# Patient Record
(demographics unavailable — no encounter records)

---

## 2024-12-26 NOTE — ASSESSMENT
[FreeTextEntry1] : A/P: 35 year old male here for his initial medical weight loss appointment. PMHx is significant for class 3 obesity, prediabetes, schizoaffective disorder, medication induced weight gain, depression, disturbed sleep patterns/insomnia, food insecurity, hx of alcohol misuse, FLOWER and tardive dyskinesia.  -Will work together with an interdisciplinary team to help achieve their weight loss goals. Will order a set of labs to better understand her metabolic state. Pending review, will have discussion about possible AOMs. For management of medication induced weight gain and prediabetes, will try metformin  mg. Discussed possible side effects including gastrointestinal upset, especially diarrhea. Will start with one tab with breakfast for one week. Increase to one tab with breakfast and one tab with dinner the second week. Rx sent today.  In future, will try to get approval for a GLP-1  Will connect with RD to help optimize nutrition and to increase protein as tolerated. Reduce his juice consumption Recommended that he repeat his sleep study Schizoaffective disorder and depression - under the care of a mental health team. Continue haldol and abilify. Made aware of weight promoting potential of these meds -Continue to increase his physical activity as tolerated. Add weights  F/U with RD first available F/U with MD in ~6-8 weeks  Patient was seen by Dr. Buck on 9/19/24. Time spent with patient was greater than 65 minutes, including chart review, time spent with patient, and charting.

## 2024-12-26 NOTE — HISTORY OF PRESENT ILLNESS
[de-identified] : Referred by: Insurance   HPI: 35 year old male here for his initial medical weight loss appointment. PMHx is significant for class 3 obesity, prediabetes, schizoaffective disorder, medication induced weight gain, depression, disturbed sleep patterns/insomnia, food insecurity, hx of alcohol misuse, FLOWER and tardive dyskinesia.    Weight History: Highest Weight: 310 lbs (2022) Lowest Weight: 210 lbs (10 yrs ago) Current Weight: 270 lbs/45.63 kg/m2   Previous Weight Loss Efforts:  Has struggled with his weight for years. In 2022, his weight was 310 lbs and was able to lose down to 270 lbs.  Has lost weight in recent months (~30 lbs) due to being homeless.   Just established a stable place to live ~1.5 months ago.  Was homeless from December 2023 until Aug.   Has previously tried Ozempic. Was on it briefly and lost 5 lbs.  Was also tried on metformin - tolerated well. Uncertain why it was discontinued.  Reports that he keeps losing and gaining ~10 lbs. 260 +/- 10 lbs  Has been on many medications for management of his schizoaffective disorder contributing to weight gain. Medications include: abilify and haldol.   Previous use of AOMs: Ozempic as above in the HPI Medications that may have contributed to weight gain: As above in the HPI   Pertinent Labs: none at this time   Review of Systems: denies numbness and tingling Reflux improved since weight loss +HAs Denies knee and back pain central adiposity  Eating behaviors: Craves sweets (doughnuts and caramel apples_ Loves cheese Has been working to decrease his intake of fried food Struggles with night eating +Bored, emotional and stress eating Has decreased his intake of soda   24 Hr Recall: B: Frosted Flakes cereal  Sn: Chips (made in the air fryer) L (12-1pm): Rice and chorizo D: Rice and beef vs chicken vs pork +/- tortillas (brother cooks this meal) Sn: Chips vs pickles Margarita: rare coffee ([employer]), drinks lots of sugary juices, drinks powdered drinks   Movement: Walking Goes to the gym for 45-60 min Does the treadmill for 45-60  min 5-7 days/week No weights  SocHx: Now lives in permanent housing - was recently homeless Currently looking for work  Former smoker - quit 10 yrs ago - smoked 1ppd x 10 yrs Drinks alcohol - has a history of alcohol use disorder - now decreased   Sleep: Goes to bed at 4 AM Sleeps throughout the day Wakes up at 8 AM Back to sleep at 12 pm - wakes up at 6 pm Hx of FLOWER - had a CPAP   Mental Health: Going through a divorce Goes to therapy 1x/week Getting a new psychiatrist on Tuesday Had food stamps. Was cut off when he was homeless. Is trying to re-establish them - has a

## 2025-02-13 NOTE — HISTORY OF PRESENT ILLNESS
[de-identified] : Referred by: Insurance   HPI: 35 year old male here for his f/u medical weight loss appointment. PMHx is significant for class 3 obesity, prediabetes, schizoaffective disorder, medication induced weight gain, dyslipidemia, metabolic syndrome, vitamin D deficiency, depression, disturbed sleep patterns/insomnia, fatty liver, food insecurity, hx of alcohol misuse, FLOWER and tardive dyskinesia.    Weight History: Highest Weight: 310 lbs (2022) Lowest Weight: 210 lbs (10 yrs ago) Weight at [facility]: 270 lbs/45.63 kg/m2 (Sept 2024) Current weight: 283 lbs  Since we last met, he reports that it took more than one month to get the metformin. Reports that his cravings continue to be greatest at night - after his evening meds. Since starting metformin, reports that his appetite has decreased during the day. Was one month off of Haldol. He started to hear voices again - is now back on it and is no longer hearing voices. Does note that the medications make him hungry.  Has started to go to the gym 3x/week for 45 min. Also has a treadmill in his home and has started to do weights for 25 min.   His brother cooks for him at night.   Reports that he has noted that he is more depressed and feels more social isolation --> sleeping more.  Reports that the Ingrezza makes him feel more sleepy.   Review of Systems is also notable for: denies numbness and tingling Reflux improved since weight loss +HAs Denies knee and back pain central adiposity  Labs Oct 2024: AST 42 H ALT 83 H hemoglobin A1c 5.9 H TChol 208H TGs 153 H HDL 39 L  H  H low vitamin D levels   Previous Weight Loss Efforts:  Has struggled with his weight for years. In 2022, his weight was 310 lbs and was able to lose down to 270 lbs.  Has lost weight in recent months (~30 lbs) due to being homeless.   Just established a stable place to live ~1.5 months ago.  Was homeless from December 2023 until Aug.   Has previously tried Ozempic. Was on it briefly and lost 5 lbs.  Was also tried on metformin - tolerated well. Uncertain why it was discontinued.  Reports that he keeps losing and gaining ~10 lbs. 260 +/- 10 lbs  Has been on many medications for management of his schizoaffective disorder contributing to weight gain. Medications include: abilify and haldol.   Previous use of AOMs: Ozempic as above in the HPI Medications that may have contributed to weight gain: As above in the HPI   Eating behaviors: Craves sweets (doughnuts and caramel apples_ Loves cheese Has been working to decrease his intake of fried food Struggles with night eating +Bored, emotional and stress eating Has decreased his intake of soda    Movement: Walking Goes to the gym for 45-60 min Does the treadmill for 45-60  min 5-7 days/week No weights  SocHx: Now lives in permanent housing - was recently homeless Currently looking for work  Former smoker - quit 10 yrs ago - smoked 1ppd x 10 yrs Drinks alcohol - has a history of alcohol use disorder - now decreased   Sleep: Goes to bed at 4 AM Sleeps throughout the day Wakes up at 8 AM Back to sleep at 12 pm - wakes up at 6 pm Hx of FLOWER - had a CPAP   Mental Health: Going through a divorce Goes to therapy 1x/week Getting a new psychiatrist on Tuesday Had food stamps. Was cut off when he was homeless. Is trying to re-establish them - has a

## 2025-02-13 NOTE — ASSESSMENT
[FreeTextEntry1] : A/P: 35 year old male here for his f/u medical weight loss appointment. PMHx is significant for class 3 obesity, prediabetes, schizoaffective disorder, medication induced weight gain, dyslipidemia, metabolic syndrome, vitamin D deficiency, depression, disturbed sleep patterns/insomnia, fatty liver, food insecurity, hx of alcohol misuse, FLOWER and tardive dyskinesia.  -Will work together with an interdisciplinary team to help achieve their weight loss goals. For management of medication induced weight gain and prediabetes, will increase his dose of metformin  mg from 1 tab BID to 2 tabs BID. Revised Rx sent today.  Will try to get approval for Mounjaro. Will start with 2.5 mg sc once weekly x 4 weeks and then increase to 5 mg sc weekly thereafter. Discussed possible side effects, including constipation, worsening reflux, gastroparesis and low risk of hypoglycemia, pain at injection site. Also discussed possible inflammation of their pancreas, low risk, but can occur. Also discussed reports of possible increased suicidal ideation. Explained that large scale observational studies have not found this association to be true, but that we will monitor closely. No personal or family history of medullary thyroid cancer. Rx sent.  Continue to work with RD to help optimize nutrition and to increase protein as tolerated. Reduce his juice consumption Recommended that he repeat his sleep study Schizoaffective disorder and depression - under the care of a mental health team. Continue haldol and abilify. Made aware of weight promoting potential of these meds -Congratulated him on his efforts to date. Continue to increase his physical activity as tolerated. Add weights. Goal: Plans to join a gym on Jan 1st. -Vitamin D deficiency - Sent Rx for 75159 units once weekly x 8 weeks, then start OTC vit D supplement  F/U with RD first available F/U with MD in ~6-8 weeks  Patient was seen by Dr. Buck on 12/26/24. Time spent with patient was greater than 35 minutes, including chart review, time spent with patient, and charting.

## 2025-07-21 NOTE — ASSESSMENT
[FreeTextEntry1] : A/P: 36 year old male here for his f/u medical weight loss appointment. PMHx is significant for class 3 obesity, prediabetes, schizoaffective disorder, medication induced weight gain, dyslipidemia, metabolic syndrome, vitamin D deficiency, depression, disturbed sleep patterns/insomnia, fatty liver, food insecurity, hx of alcohol misuse, FLOWER and tardive dyskinesia.  -Will work together with an interdisciplinary team to help achieve their weight loss goals. For management of medication induced weight gain and prediabetes, continue metformin  mg 2 tabs BID. Called refill into the pharmacy today. .  Continue Mounjaro. Will increase dose from 7.5 mg to 10 mg. Called refill into the pharmacy today.   Continue to work with RD to help optimize nutrition and to increase protein as tolerated. Recommended that he repeat his sleep study Schizoaffective disorder and depression - under the care of a mental health team. Continue haldol and abilify. Made aware of weight promoting potential of these meds -Congratulated him on his efforts to date. Continue to increase his physical activity as tolerated. Add weights. -Vitamin D deficiency - Continue vit D supplement  F/U with RD per protocol F/U with MD in ~6-8 weeks  Patient was seen by Dr. Buck on 7/21/25. Time spent with patient was greater than 25 minutes, including chart review, time spent with patient, and charting.

## 2025-07-21 NOTE — HISTORY OF PRESENT ILLNESS
[de-identified] : Referred by: Insurance   HPI: 36 year old male here for his f/u medical weight loss appointment. PMHx is significant for class 3 obesity, prediabetes, schizoaffective disorder, medication induced weight gain, dyslipidemia, metabolic syndrome, vitamin D deficiency, depression, disturbed sleep patterns/insomnia, fatty liver, food insecurity, hx of alcohol misuse, FLOWER and tardive dyskinesia.    Weight History: Highest Weight: 310 lbs (2022) Lowest Weight: 210 lbs (10 yrs ago) Weight at [facility]: 270 lbs/45.63 kg/m2 (Sept 2024) Weight trajectory 270 --> 283 --> 273 --> 279 --> 265 --> 265 Current weight: 265 lbs (Stable)  Since we last met, he has continued on metformin and Mounjaro 7.5 mg. Continues to tolerate well. Denies diarrhea. Reports that after our last visit, the electronic prescriptions were not received --> delay in him getting his medications for 3 weeks - had to be called into the pharmacy for them to go through.  Restarted them 4 weeks ago. Continues to tolerate well.  Lost his job ~ 2 mos ago - no longer on [employer].   Is focusing on getting his GED at this time.  Continues to go to the gym  Will be getting his colonoscopy in Oct.  Aware that he needs to hold his Mounjaro prior to the procedure  Continues to struggle with broken sleep - is waking up every night at 3 AM. Has discussed with his psychiatrist - has not been prescribed anything for managment Continues to work with his therapist q2 weeks - therapist also emphasizes the importance of adequate sleep   Is frustrated by his inability to break 265 lbs  Continues on haldol and abilify  Has been going to the gym 3x/week - has been doing the treadmill and elliptical  Review of Systems is also notable for: denies numbness and tingling Reflux improved since weight loss +HAs Denies knee and back pain central adiposity  Labs Oct 2024: AST 42 H ALT 83 H hemoglobin A1c 5.9 H TChol 208H TGs 153 H HDL 39 L  H  H low vitamin D levels   Previous Weight Loss Efforts:  Has struggled with his weight for years. In 2022, his weight was 310 lbs and was able to lose down to 270 lbs.  Has lost weight in recent months (~30 lbs) due to being homeless.   Just established a stable place to live ~1.5 months ago.  Was homeless from December 2023 until Aug.   Has previously tried Ozempic. Was on it briefly and lost 5 lbs.  Was also tried on metformin - tolerated well. Uncertain why it was discontinued.  Reports that he keeps losing and gaining ~10 lbs. 260 +/- 10 lbs  Has been on many medications for management of his schizoaffective disorder contributing to weight gain. Medications include: abilify and haldol.   Previous use of AOMs: Ozempic as above in the HPI Medications that may have contributed to weight gain: As above in the HPI   Eating behaviors: Craves sweets (doughnuts and caramel apples_ Loves cheese Has been working to decrease his intake of fried food Struggles with night eating +Bored, emotional and stress eating Has decreased his intake of soda    Movement: Walking Goes to the gym for 45-60 min Does the treadmill for 45-60  min 5-7 days/week No weights  SocHx: Now lives in permanent housing - was recently homeless Currently looking for work  Former smoker - quit 10 yrs ago - smoked 1ppd x 10 yrs Drinks alcohol - has a history of alcohol use disorder - now decreased   Sleep: Goes to bed at 4 AM Sleeps throughout the day Wakes up at 8 AM Back to sleep at 12 pm - wakes up at 6 pm Hx of FLOWER - had a CPAP   Mental Health: Going through a divorce Goes to therapy 1x/week Getting a new psychiatrist on Tuesday Had food stamps. Was cut off when he was homeless. Is trying to re-establish them - has a

## 2025-07-21 NOTE — PHYSICAL EXAM
[No Rash or Lesion] : No rash or lesion [Alert] : alert [Oriented to Person] : oriented to person [Oriented to Place] : oriented to place [Oriented to Time] : oriented to time [Calm] : calm [de-identified] :  Well-appearing male in NAD  [de-identified] :  CNs II-XII grossly intact  [de-identified] :  No increased WOB